# Patient Record
Sex: FEMALE | Race: WHITE | NOT HISPANIC OR LATINO | URBAN - METROPOLITAN AREA
[De-identification: names, ages, dates, MRNs, and addresses within clinical notes are randomized per-mention and may not be internally consistent; named-entity substitution may affect disease eponyms.]

---

## 2017-06-10 ENCOUNTER — HOSPITAL ENCOUNTER (EMERGENCY)
Facility: CLINIC | Age: 67
Discharge: HOME | End: 2017-06-10
Attending: EMERGENCY MEDICINE | Admitting: EMERGENCY MEDICINE
Payer: MEDICARE

## 2017-06-10 ENCOUNTER — APPOINTMENT (OUTPATIENT)
Dept: GENERAL RADIOLOGY | Facility: CLINIC | Age: 67
End: 2017-06-10
Attending: EMERGENCY MEDICINE
Payer: MEDICARE

## 2017-06-10 VITALS
TEMPERATURE: 98.4 F | BODY MASS INDEX: 32.25 KG/M2 | RESPIRATION RATE: 14 BRPM | WEIGHT: 160 LBS | HEIGHT: 59 IN | OXYGEN SATURATION: 95 % | DIASTOLIC BLOOD PRESSURE: 84 MMHG | SYSTOLIC BLOOD PRESSURE: 142 MMHG

## 2017-06-10 DIAGNOSIS — S01.81XA FACIAL LACERATION, INITIAL ENCOUNTER: ICD-10-CM

## 2017-06-10 DIAGNOSIS — S42.412A LEFT SUPRACONDYLAR HUMERUS FRACTURE, CLOSED, INITIAL ENCOUNTER: ICD-10-CM

## 2017-06-10 PROCEDURE — 73030 X-RAY EXAM OF SHOULDER: CPT | Mod: LT

## 2017-06-10 PROCEDURE — 96374 THER/PROPH/DIAG INJ IV PUSH: CPT | Mod: 59

## 2017-06-10 PROCEDURE — 25000128 H RX IP 250 OP 636: Performed by: EMERGENCY MEDICINE

## 2017-06-10 PROCEDURE — 99284 EMERGENCY DEPT VISIT MOD MDM: CPT | Mod: 25

## 2017-06-10 PROCEDURE — 12011 RPR F/E/E/N/L/M 2.5 CM/<: CPT

## 2017-06-10 RX ORDER — TRAMADOL HYDROCHLORIDE 50 MG/1
50 TABLET ORAL EVERY 6 HOURS PRN
Qty: 20 TABLET | Refills: 0 | Status: SHIPPED | OUTPATIENT
Start: 2017-06-10

## 2017-06-10 RX ORDER — KETOROLAC TROMETHAMINE 15 MG/ML
15 INJECTION, SOLUTION INTRAMUSCULAR; INTRAVENOUS ONCE
Status: COMPLETED | OUTPATIENT
Start: 2017-06-10 | End: 2017-06-10

## 2017-06-10 RX ADMIN — KETOROLAC TROMETHAMINE 15 MG: 15 INJECTION, SOLUTION INTRAMUSCULAR; INTRAVENOUS at 13:21

## 2017-06-10 ASSESSMENT — ENCOUNTER SYMPTOMS
WEAKNESS: 0
NUMBNESS: 0
ARTHRALGIAS: 1
HEADACHES: 0
VOMITING: 0
NECK PAIN: 0
WOUND: 1

## 2017-06-10 NOTE — ED AVS SNAPSHOT
Emergency Department    [address] [place] [address]    Phone:  [phone]    Fax:  [phone]                                       Ilene Evans   MRN: [id number]    Department:   Emergency Department   Date of Visit:  6/10/2017           After Visit Summary Signature Page     I have received my discharge instructions, and my questions have been answered. I have discussed any challenges I see with this plan with the nurse or doctor.    ..........................................................................................................................................  Patient/Patient Representative Signature      ..........................................................................................................................................  Patient Representative Print Name and Relationship to Patient    ..................................................               ................................................  Date                                            Time    ..........................................................................................................................................  Reviewed by Signature/Title    ...................................................              ..............................................  Date                                                            Time

## 2017-06-10 NOTE — ED PROVIDER NOTES
"  History     Chief Complaint:  Shoulder pain    HPI   Ilene Evans is a right hand dominant 66 year old female who presents via EMS for evaluation of shoulder pain. She was walking in [employer] to go to the [facility] when she stumbled and fell onto her left shoulder. She also sustained a small laceration to her forehead. She did not lose consciousness, and denies nausea, vomiting, visual deficit, focal numbness or weakness. No pain to neck, back, or lower extremities. [employer] placed her in sling and swath and medics transported her to the ED, administering 2mg Dilaudid and 50mg Ketamine en route. Tetanus is up to date.      Allergies:  Niacin (flushing)     Medications:    Atorvastatin  Synthroid  Tramadol      Past Medical History:    Hyperlipidemia     Past Surgical History:    Right hip surgery    Family History:    History reviewed. No pertinent family history.      Social History:  Former tobacco smoker.   Marital Status:   [5]     Review of Systems   Gastrointestinal: Negative for vomiting.   Musculoskeletal: Positive for arthralgias (seeHPI). Negative for neck pain.   Skin: Positive for wound (see HPI).   Neurological: Negative for syncope, weakness, numbness and headaches.   All other systems reviewed and are negative.        Physical Exam   First Vitals:  BP: (!) 177/124  Heart Rate: 97  Temp: 98.4  F (36.9  C)  Resp: 14  Height: 149.9 cm (4' 11\")  Weight: 72.6 kg (160 lb)  SpO2: 99 %    Physical Exam  Physical Exam   Constitutional:  Patient is oriented to person, place, and time. They appear well-developed and well-nourished. Mild distress secondary to pain   HENT:    2cm superficial laceration to right frontal aspect. No bony deformities or ecchymosis.  No epistaxis or septal hematoma.   Mouth/Throat:   Oropharynx is clear and moist.  No intraoral injury.   Eyes:    Conjunctivae normal and EOM are normal. Pupils are equal, round, and reactive to light.  No evidence of " entrapment.  Neck:    Normal range of motion.   Cardiovascular: Normal rate, regular rhythm and normal heart sounds.  Exam reveals no gallop and no friction rub.  No murmur heard.  Pulmonary/Chest:  Effort normal and breath sounds normal. Patient has no wheezes. Patient has no rales.   Abdominal:   Soft. Bowel sounds are normal. Patient exhibits no mass. There is no tenderness. There is no rebound and no guarding.   Musculoskeletal:  Unable to move left shoulder secondary to pain. There is mild edema surrounding the area. No gross bony deformities. No elbow or wrist pain. Normal sensation distal to the shoulder. No clavicle pain.  Neurological:   Patient is alert and oriented to person, place, and time. Patient has normal strength. No cranial nerve deficit or sensory deficit. GCS 15  Skin:   Skin is warm and dry. No rash noted. No erythema.   Psychiatric:   Patient has a normal mood and affect. Patient's behavior is normal. Judgment and thought content normal.        Emergency Department Course   Imaging:  Radiographic findings were communicated with the patient who voiced understanding of the findings.  XR Shoulder Left: Displaced fracture through the proximal humerus. The humerus head is displaced laterally 9 mm primarily at the surgical neck site. Acromioclavicular articulation intact. Results per Radiology.     Procedures:    Narrative: Procedure: Laceration Repair        LACERATION:  A superficial clean 2 cm laceration.      LOCATION:  Left frontal aspect forehead      FUNCTION:  Distally sensation, circulation, motor and tendon function are intact.      ANESTHESIA:  Local using 1% lidocaine total of 1 mLs      PREPARATION:  Irrigation and Scrubbing with Normal Saline      DEBRIDEMENT:  no debridement      CLOSURE:  Wound was closed with One Layer.  Skin closed with 2 x 6.0 Nylon using interrupted sutures.       Splint Application  Indication: humeral fracture   Procedure: Swath splint applied to left arm for  comfort. CMS remained intact pre- and post-procedure. No immediate complications.    Interventions:  1321: Ketorolac 15mg, IV    Emergency Department Course:  Past medical records, nursing notes, and vitals reviewed.  I performed an exam of the patient as documented above. GCS 15. The above imaging study was obtained. She was given the above intervention with improvement.  Laceration repaired and splint placed as above. Clinical findings and plan explained to the Patient. Patient discharged home with instructions regarding supportive care, medications, and reasons to return as well as the importance of close follow-up were reviewed.      Impression & Plan      Medical Decision Making:  The patient presents after mechanical fall. X-rays were performed of her shoulder and she does have a humeral head fracture. No dislocation. She was placed in a supportive swath for comfort and given the above pain medication.  She underwent the above laceration repair and is up to date on her tetanus. At this point I will have her follow up with Orthopedics and I will write her for a few Tramadol. She does have a history of chemical dependency so did not want any significant medications.  At this time, pain is controlled, she will be discharged.     Critical Care time:  none    Diagnosis:    ICD-10-CM    1. Left supracondylar humerus fracture, closed, initial encounter S42.412A    2. Facial laceration, initial encounter S01.81XA        Disposition:  discharged to home    Discharge Medications:  Discharge Medication List as of 6/10/2017  2:11 PM      START taking these medications    Details   traMADol (ULTRAM) 50 MG tablet Take 1 tablet (50 mg) by mouth every 6 hours as needed for pain maximum 2 tablet(s) per day, Disp-20 tablet, R-0, Local Print               Brian Singer  6/10/2017    EMERGENCY DEPARTMENT  I, Brian Singer am serving as a scribe at 3:30 PM on 6/10/2017 to document services personally performed by Dr. Gunter  based on my observations and the provider's statements to me.       Samreen Gunter MD  06/10/17 [id number]

## 2017-06-10 NOTE — ED AVS SNAPSHOT
Emergency Department    [address] [place] [address]    Phone:  [phone]    Fax:  [phone]                                       Ilene Evnas   MRN: [id number]    Department:   Emergency Department   Date of Visit:  6/10/2017           Patient Information     Date Of Birth          1950        Your diagnoses for this visit were:     Left supracondylar humerus fracture, closed, initial encounter     Facial laceration, initial encounter        You were seen by Samreen Gunter MD.      Follow-up Information     Please follow up.    Why:  Follow up with ortho on monday. Ice shoulder 30 minutes every couple hours. Tyelnol or tramadol for pain. Stitches out in 5 days      Discharge References/Attachments     LACERATION, FACE: SUTURE OR TAPE (ENGLISH)    FRACTURE, SHOULDER (ENGLISH)      24 Hour Appointment Hotline       To make an appointment at any [facility], call [phone] ([phone]). If you don't have a family doctor or clinic, we will help you find one. [facility] are conveniently located to serve the needs of you and your family.             Review of your medicines      START taking        Dose / Directions Last dose taken    traMADol 50 MG tablet   Commonly known as:  ULTRAM   Dose:  50 mg   Quantity:  20 tablet        Take 1 tablet (50 mg) by mouth every 6 hours as needed for pain maximum 2 tablet(s) per day   Refills:  0          Our records show that you are taking the medicines listed below. If these are incorrect, please call your family doctor or clinic.        Dose / Directions Last dose taken    ATORVASTATIN CALCIUM PO        Refills:  0        SYNTHROID PO        Refills:  0                Prescriptions were sent or printed at these locations (1 Prescription)                   Other Prescriptions                Printed at Department/Unit printer (1 of 1)         traMADol (ULTRAM) 50 MG tablet                Procedures and tests  performed during your visit     XR Shoulder Left G/E 3 Views      Orders Needing Specimen Collection     None      Pending Results     No orders found from 6/8/2017 to 6/11/2017.            Pending Culture Results     No orders found from 6/8/2017 to 6/11/2017.            Pending Results Instructions     If you had any lab results that were not finalized at the time of your Discharge, you can call the ED Lab Result RN at [phone]. You will be contacted by this team for any positive Lab results or changes in treatment. The nurses are available 7 days a week from 10A to 6:30P.  You can leave a message 24 hours per day and they will return your call.        Test Results From Your Hospital Stay        6/10/2017  2:08 PM      Narrative     LEFT SHOULDER THREE OR MORE VIEWS  6/10/2017 12:05 PM     HISTORY: Pain after fall.    COMPARISON: None.        Impression     IMPRESSION: Displaced fracture through the proximal humerus. The  humerus head is displaced laterally 9 mm primarily at the surgical  neck site. Acromioclavicular articulation intact.    AJ BECKETT MD                Clinical Quality Measure: Blood Pressure Screening     Your blood pressure was checked while you were in the emergency department today. The last reading we obtained was  BP: 142/84 . Please read the guidelines below about what these numbers mean and what you should do about them.  If your systolic blood pressure (the top number) is less than 120 and your diastolic blood pressure (the bottom number) is less than 80, then your blood pressure is normal. There is nothing more that you need to do about it.  If your systolic blood pressure (the top number) is 120-139 or your diastolic blood pressure (the bottom number) is 80-89, your blood pressure may be higher than it should be. You should have your blood pressure rechecked within a year by a primary care provider.  If your systolic blood pressure (the top number) is 140 or greater or your  "diastolic blood pressure (the bottom number) is 90 or greater, you may have high blood pressure. High blood pressure is treatable, but if left untreated over time it can put you at risk for heart attack, stroke, or kidney failure. You should have your blood pressure rechecked by a primary care provider within the next 4 weeks.  If your provider in the emergency department today gave you specific instructions to follow-up with your doctor or provider even sooner than that, you should follow that instruction and not wait for up to 4 weeks for your follow-up visit.        Thank you for choosing [facility]       Thank you for choosing [facility] for your care. Our goal is always to provide you with excellent care. Hearing back from our patients is one way we can continue to improve our services. Please take a few minutes to complete the written survey that you may receive in the mail after you visit with us. Thank you!        [facility] Information     [employer] lets you send messages to your doctor, view your test results, renew your prescriptions, schedule appointments and more. To sign up, go to [email or web address] . Click on \"Log in\" on the left side of the screen, which will take you to the Welcome page. Then click on \"Sign up Now\" on the right side of the page.     You will be asked to enter the access code listed below, as well as some personal information. Please follow the directions to create your username and password.     Your access code is: [email or web address]  Expires: 2017  1:52 PM     Your access code will  in 90 days. If you need help or a new code, please call your [facility] clinic or [phone].        Care EveryWhere ID     This is your Care EveryWhere ID. This could be used by other organizations to access your [facility] medical records  [id number]        After Visit Summary       This is your record. Keep this with you and show to your community pharmacist(s) and doctor(s) at your next visit.  "

## 2019-12-04 ENCOUNTER — RECORDS - HEALTHEAST (OUTPATIENT)
Dept: LAB | Facility: CLINIC | Age: 69
End: 2019-12-04

## 2019-12-04 LAB
ANION GAP SERPL CALCULATED.3IONS-SCNC: 9 MMOL/L (ref 5–18)
BUN SERPL-MCNC: 16 MG/DL (ref 8–22)
CALCIUM SERPL-MCNC: 8.7 MG/DL (ref 8.5–10.5)
CHLORIDE BLD-SCNC: 107 MMOL/L (ref 98–107)
CO2 SERPL-SCNC: 26 MMOL/L (ref 22–31)
CREAT SERPL-MCNC: 0.64 MG/DL (ref 0.6–1.1)
GFR SERPL CREATININE-BSD FRML MDRD: >60 ML/MIN/1.73M2
GLUCOSE BLD-MCNC: 73 MG/DL (ref 70–125)
HGB BLD-MCNC: 10.9 G/DL (ref 12–16)
MAGNESIUM SERPL-MCNC: 2.2 MG/DL (ref 1.8–2.6)
POTASSIUM BLD-SCNC: 3.4 MMOL/L (ref 3.5–5)
SODIUM SERPL-SCNC: 142 MMOL/L (ref 136–145)
TSH SERPL DL<=0.005 MIU/L-ACNC: 8.03 UIU/ML (ref 0.3–5)

## 2019-12-11 ENCOUNTER — RECORDS - HEALTHEAST (OUTPATIENT)
Dept: LAB | Facility: CLINIC | Age: 69
End: 2019-12-11

## 2019-12-12 LAB — POTASSIUM BLD-SCNC: 3.9 MMOL/L (ref 3.5–5)

## 2021-01-01 ENCOUNTER — RECORDS - HEALTHEAST (OUTPATIENT)
Dept: ADMINISTRATIVE | Facility: OTHER | Age: 71
End: 2021-01-01

## 2021-01-01 ENCOUNTER — AMBULATORY - HEALTHEAST (OUTPATIENT)
Dept: HOSPICE | Facility: HOSPICE | Age: 71
End: 2021-01-01

## 2021-01-01 ENCOUNTER — HOME INFUSION (PRE-WILLOW HOME INFUSION) (OUTPATIENT)
Dept: PHARMACY | Facility: CLINIC | Age: 71
End: 2021-01-01

## 2021-01-01 ENCOUNTER — AMBULATORY - HEALTHEAST (OUTPATIENT)
Dept: OTHER | Facility: CLINIC | Age: 71
End: 2021-01-01

## 2021-01-01 ENCOUNTER — RECORDS - HEALTHEAST (OUTPATIENT)
Dept: LAB | Facility: CLINIC | Age: 71
End: 2021-01-01

## 2021-01-01 ENCOUNTER — COMMUNICATION - HEALTHEAST (OUTPATIENT)
Dept: SCHEDULING | Facility: CLINIC | Age: 71
End: 2021-01-01

## 2021-01-01 ENCOUNTER — DOCUMENTATION ONLY (OUTPATIENT)
Dept: OTHER | Facility: CLINIC | Age: 71
End: 2021-01-01

## 2021-01-01 DIAGNOSIS — F11.90 OPIATE USE: ICD-10-CM

## 2021-01-01 LAB
ARUP MISCELLANEOUS TEST: NORMAL
MISCELLANEOUS TEST DEPT. - HE HISTORICAL: NORMAL
PERFORMING LAB: NORMAL
SPECIMEN STATUS: NORMAL
TEST NAME: NORMAL

## 2021-04-02 NOTE — PROGRESS NOTES
This is a recent snapshot of the patient's [facility] medical record.  For current drug dose and complete information and questions, call [phone] or In Basket pool, [facility] home infusion ([id number])  CSN Number:  [id number]

## 2021-06-16 PROBLEM — G12.21 ALS (AMYOTROPHIC LATERAL SCLEROSIS) (H): Status: ACTIVE | Noted: 2021-01-01

## 2021-06-16 PROBLEM — Z51.5 HOSPICE CARE: Status: ACTIVE | Noted: 2021-01-01

## 2021-06-16 NOTE — PROGRESS NOTES
Hospice physician visit  Location hospice home  Reason for visit: Assess symptoms and determine prognosis  I wore an N95 mask and face shield to visit the patient  HPI: The patient is a 70-year-old woman with ALS and related dysphagia, G-tube feedings and chronic respiratory failure.  She was admitted to hospice after withdrawing from hospice due to concerns of inadequate care at her facility, then was admitted to the hospital.  On hospital admission she was noted to have severe dysphagia due to her ALS with recent G-tube placement on 2/1/2020 as well as respiratory failure, using trilogy noninvasive ventilator and CoughAssist at times.  She was eating and drinking.  Foods and thickened liquids for pleasure.  At the time of hospice discharge she was taking oxycodone 5 mg every 4 hours scheduled.  She was discharged to a hospice home and elected hospice care with her agency.  She has been concerned about overmedication.  She believes that the hospital staff had given her methadone on at least 2 occasions.  She initially on arriving at the hospice home was alert, oriented able to communicate.  Then she had several days where she was very sleepy, lethargic, confused.  These days correlate with the days she believes she received methadone.  Today she is more awake and alert.  Although she does have respiratory failure, she prefers not to wear her trilogy or use the CoughAssist.  She is bothered by excessive secretions but does not like to feel dried out.  She is G-tube fed but using the G-tube very sparingly, preferring to eat and drink for comfort.  She feels overly full when she uses more than 1 can of tube feed per day.  She has had weight loss prior to starting hospice care with us, 11% / 15 pounds over the past 5 months.  She has a known low FVC, now 24%.  She has developed multiple stage II pressure injuries, right leg, anterior nose, coccyx.  I reviewed her records from her specialist at [facility],   Domitila, who manages her ALS.  Lorazepam has not been helpful for anxiety in the past but Haldol has been very helpful.  Functionally she is total care, bed to chair existence with a Dana lift, spends most of her time in bed or in her electric wheelchair which does recline, incontinent of bowel and bladder.  She has very little movement in her arms or legs.    PMH: Hypertension, lumbar radiculopathy history of joint pain, osteopenia, borderline type 2 diabetes.  Surgical history: Gastric bypass, La-en-Y in 2003, tubal ligation, ORIF of the wrist in the 1980s, bunionectomy, cataracts, right total hip, L5-S1 back surgery.  Family history: Unknown.  Social history: Distant history of smoking.  No alcohol since 2013.  Uses pubic catheters for urination.  12 point review systems negative except as per HPI    Objective  Awake, alert, communicates by pointing to letters on a letter board.  Answers mostly yes no questions.  Becomes fatigued over the course of our 30 to 45-minute visit.  Blood pressure 92/60, pulse 68, respirations 20, O2 sats unable, temp 98.  No drooling.  Chest: Very diminished air entry and exit without adventitious sounds.  Heart: RRR, distant tones.  Abdomen: Soft, nondistended, nontender, bowel sounds present, G-tube site without redness or irritation.  Extremities: Trace pitting edema in the feet, feet are cool with diminished capillary refill and pulses.  Neuro: Barely able to use her communicator due to limited arm and hand movement.  Able to nod head slightly yes and no.    Assessment: The patient is a 70-year-old woman with ALS and severe dysphagia and respiratory failure.  Her prognosis is poor, estimated less than 6 months given her advanced disease.    Plan: We spent the majority of today's visit, 45 minutes in total, discussing symptom management, concerns about inadvertent medication administration.  Ilene confirms that her pain and shortness of breath are currently well managed; we do not  plan to make changes.  She does not like wearing her trilogy, and does not enhance her comfort.  It is available if she decides to use it, but we are managing symptoms in other ways.  We discussed the balance of secretions versus drying her out.  She feels her secretions are managed to her satisfaction, she is more concerned about being dry.  We reviewed with hospice on staff that they are not giving excessive free water flushes, only 1 can of Nutren per day.  We will arrange to draw serum methadone level to better understand the medication issue.    Jenny Silva MD, [facility]  Medical Director  [facility]

## 2021-06-16 NOTE — TELEPHONE ENCOUNTER
Michelle (POA) calls and says that she needs to speak to the nurse at [facility] who is taking care of Ilene. RN then gave Michelle the phone # to the [facility] for further assistance. COVID 19 Nurse Triage Plan/Patient Instructions    Please be aware that novel coronavirus (COVID-19) may be circulating in the community. If you develop symptoms such as fever, cough, or SOB or if you have concerns about the presence of another infection including coronavirus (COVID-19), please contact your health care provider or visit  [email or web address].    Disposition/Instructions    Home care recommended. Follow home care protocol based instructions.    Thank you for taking steps to prevent the spread of this virus.  o Limit your contact with others.  o Wear a simple mask to cover your cough.  o Wash your hands well and often.    Resources    [facility]: About COVID-19: [email or web address]    CDC: What to Do If You're Sick: www.cdc.gov/coronavirus/2019-ncov/about/steps-when-sick.html    CDC: Ending Home Isolation: www.cdc.gov/coronavirus/2019-ncov/hcp/disposition-in-home-patients.html     CDC: Caring for Someone: www.cdc.gov/coronavirus/2019-ncov/if-you-are-sick/care-for-someone.html     [facility]: Interim Guidance for Hospital Discharge to Home: www.health.[facility].mn.us/diseases/coronavirus/hcp/hospdischarge.pdf    [facility] clinical trials (COVID-19 research studies): [email or web address].[email or web address]/umn-clinical-trials     Below are the COVID-19 hotlines at the [facility] ([facility]). Interpreters are available.   o For health questions: Call 688-300-8164 or 1-585.999.1886 (7 a.m. to 7 p.m.)  o For questions about schools and childcare: Call 916-822-1339 or 1-778.404.3275 (7 a.m. to 7 p.m.)       Additional Information    Negative: [1] Caller is not with the adult (patient) AND [2] reporting urgent symptoms    Negative: Lab result questions    Negative: Medication  questions    Negative: Caller can't be reached by phone    Negative: Caller has already spoken to PCP or another triager    Negative: RN needs further essential information from caller in order to complete triage    Negative: Requesting regular office appointment    Negative: [1] Caller requesting NON-URGENT health information AND [2] PCP's office is the best resource    Negative: Health Information question, no triage required and triager able to answer question    General information question, no triage required and triager able to answer question    Protocols used: INFORMATION ONLY CALL-A-AH

## 2021-07-06 ENCOUNTER — HOME INFUSION (PRE-WILLOW HOME INFUSION) (OUTPATIENT)
Dept: PHARMACY | Facility: CLINIC | Age: 71
End: 2021-07-06

## 2021-07-09 NOTE — PROGRESS NOTES
This is a recent snapshot of the patient's [facility] medical record.  For current drug dose and complete information and questions, call [phone] or In Basket pool, [facility] home infusion ([id number])  CSN Number:  [id number]